# Patient Record
Sex: FEMALE | Race: ASIAN | NOT HISPANIC OR LATINO | Employment: FULL TIME
[De-identification: names, ages, dates, MRNs, and addresses within clinical notes are randomized per-mention and may not be internally consistent; named-entity substitution may affect disease eponyms.]

---

## 2017-10-29 ENCOUNTER — HEALTH MAINTENANCE LETTER (OUTPATIENT)
Age: 32
End: 2017-10-29

## 2020-03-01 ENCOUNTER — HEALTH MAINTENANCE LETTER (OUTPATIENT)
Age: 35
End: 2020-03-01

## 2020-12-14 ENCOUNTER — HEALTH MAINTENANCE LETTER (OUTPATIENT)
Age: 35
End: 2020-12-14

## 2021-04-17 ENCOUNTER — HEALTH MAINTENANCE LETTER (OUTPATIENT)
Age: 36
End: 2021-04-17

## 2021-10-02 ENCOUNTER — HEALTH MAINTENANCE LETTER (OUTPATIENT)
Age: 36
End: 2021-10-02

## 2022-05-14 ENCOUNTER — HEALTH MAINTENANCE LETTER (OUTPATIENT)
Age: 37
End: 2022-05-14

## 2022-09-03 ENCOUNTER — HEALTH MAINTENANCE LETTER (OUTPATIENT)
Age: 37
End: 2022-09-03

## 2023-06-02 ENCOUNTER — HEALTH MAINTENANCE LETTER (OUTPATIENT)
Age: 38
End: 2023-06-02

## 2024-05-15 ENCOUNTER — HOSPITAL ENCOUNTER (EMERGENCY)
Facility: HOSPITAL | Age: 39
Discharge: HOME | End: 2024-05-15
Payer: COMMERCIAL

## 2024-05-15 VITALS
OXYGEN SATURATION: 100 % | HEART RATE: 92 BPM | WEIGHT: 150 LBS | HEIGHT: 59 IN | TEMPERATURE: 98.9 F | RESPIRATION RATE: 16 BRPM | DIASTOLIC BLOOD PRESSURE: 84 MMHG | BODY MASS INDEX: 30.24 KG/M2 | SYSTOLIC BLOOD PRESSURE: 131 MMHG

## 2024-05-15 DIAGNOSIS — R30.0 DYSURIA: ICD-10-CM

## 2024-05-15 DIAGNOSIS — R39.15 URINARY URGENCY: ICD-10-CM

## 2024-05-15 DIAGNOSIS — N39.0 URINARY TRACT INFECTION IN FEMALE: Primary | ICD-10-CM

## 2024-05-15 PROBLEM — E11.9 CONTROLLED TYPE 2 DIABETES MELLITUS WITHOUT COMPLICATION, WITH LONG-TERM CURRENT USE OF INSULIN (H): Status: ACTIVE | Noted: 2024-05-15

## 2024-05-15 PROBLEM — Z79.4 CONTROLLED TYPE 2 DIABETES MELLITUS WITHOUT COMPLICATION, WITH LONG-TERM CURRENT USE OF INSULIN (H): Status: ACTIVE | Noted: 2024-05-15

## 2024-05-15 LAB
ALBUMIN SERPL BCG-MCNC: 4.5 G/DL (ref 3.5–5.2)
ALBUMIN UR-MCNC: 100 MG/DL
ALP SERPL-CCNC: 68 U/L (ref 40–150)
ALT SERPL W P-5'-P-CCNC: 12 U/L (ref 0–50)
ANION GAP SERPL CALCULATED.3IONS-SCNC: 12 MMOL/L (ref 7–15)
APPEARANCE UR: ABNORMAL
AST SERPL W P-5'-P-CCNC: 18 U/L (ref 0–45)
BASOPHILS # BLD AUTO: 0 10E3/UL (ref 0–0.2)
BASOPHILS NFR BLD AUTO: 0 %
BILIRUB DIRECT SERPL-MCNC: <0.2 MG/DL (ref 0–0.3)
BILIRUB SERPL-MCNC: 0.6 MG/DL
BILIRUB UR QL STRIP: NEGATIVE
BUN SERPL-MCNC: 10.8 MG/DL (ref 6–20)
CALCIUM SERPL-MCNC: 10.2 MG/DL (ref 8.6–10)
CHLORIDE SERPL-SCNC: 101 MMOL/L (ref 98–107)
COLOR UR AUTO: ABNORMAL
CREAT SERPL-MCNC: 0.68 MG/DL (ref 0.51–0.95)
DEPRECATED HCO3 PLAS-SCNC: 24 MMOL/L (ref 22–29)
EGFRCR SERPLBLD CKD-EPI 2021: >90 ML/MIN/1.73M2
EOSINOPHIL # BLD AUTO: 0.1 10E3/UL (ref 0–0.7)
EOSINOPHIL NFR BLD AUTO: 1 %
ERYTHROCYTE [DISTWIDTH] IN BLOOD BY AUTOMATED COUNT: 13.3 % (ref 10–15)
GLUCOSE SERPL-MCNC: 167 MG/DL (ref 70–99)
GLUCOSE UR STRIP-MCNC: 50 MG/DL
HCG UR QL: NEGATIVE
HCT VFR BLD AUTO: 37.1 % (ref 35–47)
HGB BLD-MCNC: 12.4 G/DL (ref 11.7–15.7)
HGB UR QL STRIP: ABNORMAL
HOLD SPECIMEN: NORMAL
HOLD SPECIMEN: NORMAL
IMM GRANULOCYTES # BLD: 0 10E3/UL
IMM GRANULOCYTES NFR BLD: 0 %
KETONES UR STRIP-MCNC: NEGATIVE MG/DL
LEUKOCYTE ESTERASE UR QL STRIP: ABNORMAL
LYMPHOCYTES # BLD AUTO: 1.5 10E3/UL (ref 0.8–5.3)
LYMPHOCYTES NFR BLD AUTO: 13 %
MCH RBC QN AUTO: 27.6 PG (ref 26.5–33)
MCHC RBC AUTO-ENTMCNC: 33.4 G/DL (ref 31.5–36.5)
MCV RBC AUTO: 83 FL (ref 78–100)
MONOCYTES # BLD AUTO: 0.8 10E3/UL (ref 0–1.3)
MONOCYTES NFR BLD AUTO: 6 %
NEUTROPHILS # BLD AUTO: 9.7 10E3/UL (ref 1.6–8.3)
NEUTROPHILS NFR BLD AUTO: 80 %
NITRATE UR QL: NEGATIVE
NRBC # BLD AUTO: 0 10E3/UL
NRBC BLD AUTO-RTO: 0 /100
PH UR STRIP: 6 [PH] (ref 5–7)
PLATELET # BLD AUTO: 249 10E3/UL (ref 150–450)
POTASSIUM SERPL-SCNC: 3.8 MMOL/L (ref 3.4–5.3)
PROT SERPL-MCNC: 8.2 G/DL (ref 6.4–8.3)
RBC # BLD AUTO: 4.49 10E6/UL (ref 3.8–5.2)
RBC URINE: >182 /HPF
SODIUM SERPL-SCNC: 137 MMOL/L (ref 135–145)
SP GR UR STRIP: 1.01 (ref 1–1.03)
UROBILINOGEN UR STRIP-MCNC: <2 MG/DL
WBC # BLD AUTO: 12.2 10E3/UL (ref 4–11)
WBC URINE: 107 /HPF

## 2024-05-15 PROCEDURE — 36415 COLL VENOUS BLD VENIPUNCTURE: CPT

## 2024-05-15 PROCEDURE — 82248 BILIRUBIN DIRECT: CPT

## 2024-05-15 PROCEDURE — 87086 URINE CULTURE/COLONY COUNT: CPT | Performed by: STUDENT IN AN ORGANIZED HEALTH CARE EDUCATION/TRAINING PROGRAM

## 2024-05-15 PROCEDURE — 82374 ASSAY BLOOD CARBON DIOXIDE: CPT

## 2024-05-15 PROCEDURE — 81025 URINE PREGNANCY TEST: CPT | Performed by: EMERGENCY MEDICINE

## 2024-05-15 PROCEDURE — 85025 COMPLETE CBC W/AUTO DIFF WBC: CPT

## 2024-05-15 PROCEDURE — 99284 EMERGENCY DEPT VISIT MOD MDM: CPT

## 2024-05-15 PROCEDURE — 81001 URINALYSIS AUTO W/SCOPE: CPT | Performed by: STUDENT IN AN ORGANIZED HEALTH CARE EDUCATION/TRAINING PROGRAM

## 2024-05-15 PROCEDURE — 87186 SC STD MICRODIL/AGAR DIL: CPT | Performed by: STUDENT IN AN ORGANIZED HEALTH CARE EDUCATION/TRAINING PROGRAM

## 2024-05-15 RX ORDER — PHENAZOPYRIDINE HYDROCHLORIDE 100 MG/1
200 TABLET, FILM COATED ORAL 3 TIMES DAILY PRN
Qty: 6 TABLET | Refills: 0 | Status: SHIPPED | OUTPATIENT
Start: 2024-05-15 | End: 2024-05-17

## 2024-05-15 RX ORDER — NITROFURANTOIN 25; 75 MG/1; MG/1
100 CAPSULE ORAL 2 TIMES DAILY
Qty: 10 CAPSULE | Refills: 0 | Status: SHIPPED | OUTPATIENT
Start: 2024-05-15 | End: 2024-05-20

## 2024-05-15 ASSESSMENT — ACTIVITIES OF DAILY LIVING (ADL): ADLS_ACUITY_SCORE: 35

## 2024-05-15 ASSESSMENT — COLUMBIA-SUICIDE SEVERITY RATING SCALE - C-SSRS
6. HAVE YOU EVER DONE ANYTHING, STARTED TO DO ANYTHING, OR PREPARED TO DO ANYTHING TO END YOUR LIFE?: NO
1. IN THE PAST MONTH, HAVE YOU WISHED YOU WERE DEAD OR WISHED YOU COULD GO TO SLEEP AND NOT WAKE UP?: NO
2. HAVE YOU ACTUALLY HAD ANY THOUGHTS OF KILLING YOURSELF IN THE PAST MONTH?: NO

## 2024-05-15 NOTE — DISCHARGE INSTRUCTIONS
Drink lots of fluids.  Finish the entire course of antibiotics (Macrobid), even if your symptoms improve before completion. It's essential to ensure the infection is fully treated.  Use acetaminophen, ibuprofen as needed for pain. I have prescribed you phenazopyridine (Pyridium) to relieve discomfort or burning during urination.  Continue taking blood pressure medication and follow up with primary care in 5-7 days for reevaluation.     Call your doctor now or seek immediate medical care if:    Symptoms such as fever, chills, nausea, or vomiting get worse or appear for the first time.     You have new pain in your back just below your rib cage. This is called flank pain.     There is new blood or pus in your urine.     You have any problems with your antibiotic medicine.   Watch closely for changes in your health, and be sure to contact your doctor if:    You are not getting better after taking an antibiotic for 2 days.     Your symptoms go away but then come back.

## 2024-05-15 NOTE — Clinical Note
Will Figueredo accompanied Mandy Figueredo to the emergency department on 5/15/2024. They may return to work on 05/16/2024.      If you have any questions or concerns, please don't hesitate to call.      Ingris Cruz PA-C

## 2024-05-15 NOTE — ED PROVIDER NOTES
Emergency Department Encounter  [facility] EMERGENCY DEPARTMENT  Mandy Figueredo   AGE: 39 year old SEX: female  YOB: 1985  MRN: [id number]      EVALUATION DATE & TIME: No admission date for patient encounter. ; PCP: Taco Arizmendi   ED PROVIDER: Ingris Cruz PA-C    CHIEF COMPLAINT: Urinary Frequency      MEDICAL DECISION MAKING   Mandy Figueredo is a 39 year old female with a pertinent history of type 2 diabetes and hypothyroidism who presents to the ED for evaluation of urinary frequency and dysuria that started suddenly this morning at 2 AM.  Patient does not get frequent UTIs.  Recently gave birth via  section on 2023.  No vaginal symptoms or concerns for sexually transmitted infections.  Currently menstruating.  No fevers, chills, nausea, vomiting, abdominal pain, or flank pain.    Vitals reviewed and hemodynamically stable, afebrile.  On exam, patient is nontoxic and well-appearing.  Abdomen is without tenderness, rebound, or guarding.  No CVA tenderness.      Urinalysis concerning for urinary tract infection.  There is no pain over the kidney region or systemic signs of infection to indicate ascending urinary tract infection. I considered pregnancy-related complications however urine pregnancy is negative thereby making this sufficiently unlikely.  Low suspicion for kidney stone, ovarian torsion, PID, and appendicitis.  Blood pressure was noted to be elevated in triage (187/91) and again at discharge (165/101).  I reevaluated patient in triage bay and changed blood pressure cuff to adult size which was appropriate based on measurements.  Repeat blood pressure 149/89.  Given recent pregnancy and delivery 11 weeks ago, did order basic preeclampsia labs.  While there is proteinuria in her urine, patient does have a concurrent UTI and has not had 2 consecutive blood pressures with systolic above 140 or diastolic above 90. Fortunately, there is no LFT abnormalities,  thrombocytopenia, or acute kidney dysfunction that would raise concern for HELLP or preeclampsia.      Blood pressures have improved significantly with private room and proper sizing blood pressure cuff.  Plan to discharge home with close primary care follow-up.   Prescription for Macrobid and Pyridium provided. Patient has a clear understanding of the discharge diagnosis, written discharge instructions, and planned follow-up with clear instructions to return to the emergency department if the condition worsens, specifically the development of systemic symptoms and the inability to tolerate fluids or antibiotics by mouth.    Medical Decision Making  Obtained supplemental history:Supplemental history obtained?: Documented in chart and Family Member/Significant Other  Reviewed external records: External records reviewed?: Documented in chart  Care impacted by chronic illness:Hypertension  Care significantly affected by social determinants of health:N/A  Did you consider but not order tests?: Work up considered but not performed and documented in chart, if applicable  Did you interpret images independently?: Independent interpretation of ECG and images noted in documentation, when applicable.  Consultation discussion with other provider:Did you involve another provider (consultant, , pharmacy, etc.)?: No  Discharge. I prescribed additional prescription strength medication(s) as charted. I considered admission, but ultimately discharged patient improving blood pressures, no evidence of pyelonephritis or sepsis.    FINAL IMPRESSION       ICD-10-CM    1. Urinary tract infection in female  N39.0       2. Urinary urgency  R39.15       3. Dysuria  R30.0           ED COURSE   3:13 PM I met and introduced myself to the patient. I gathered initial history and performed my physical exam.  Initial physical exam completed in upright chair in temporary exam room due to boarding crisis in ED while patient awaits for room with  assigned RN and vital monitoring. We discussed plan for initial workup and patient was directed back to waiting room.  5:13 PM Patient noted to have elevated /101. Patient is 11 weeks 4 days post partum. Will draw preeclampsia labs given proteinuria in urine.  5:15 PM Blood pressure reassessed with adult cuff, 149/89.  Patient without headache or abdominal pain.  Patient endorses a history of preeclampsia from last pregnancy and has been taking labetalol 100 mg daily.  6:33 PM I rechecked the patient and discussed results, discharge, follow up, and reasons to return to the ED.     MEDICATIONS GIVEN IN THE EMERGENCY DEPARTMENT:   Medications - No data to display   NEW PRESCRIPTIONS STARTED AT TODAY'S ED VISIT:  Discharge Medication List as of 5/15/2024  6:40 PM        START taking these medications    Details   nitroFURantoin macrocrystal-monohydrate (MACROBID) 100 MG capsule Take 1 capsule (100 mg) by mouth 2 times daily for 5 days, Disp-10 capsule, R-0, E-Prescribe      phenazopyridine (PYRIDIUM) 100 MG tablet Take 2 tablets (200 mg) by mouth 3 times daily as needed for urinary tract discomfort or pain, Disp-6 tablet, R-0, E-Prescribe                 =================================================================         HISTORY OF PRESENT ILLNESS   Patient information was obtained from: patient, spouse   Use of Intrepreter: N/A     Mandy Figueredo is a 39 year old female with a pertinent history of type 2 diabetes and hypothyroidism who presents to the ED by private vehicle for evaluation of increased urinary frequency and dysuria.  Patient reports symptoms started suddenly at 2 AM when she woke from sleep and had to use the restroom.  Since, she has been experiencing increased urinary frequency and feels the urge to urinate every few minutes.  Patient does not get frequent urinary tract infections.  Patient recently gave birth on  and is s/p  section.  No abnormal vaginal discharge or concerns  "for sexually transmitted infections.  Currently menstruating.  No fevers, chills, nausea, vomiting, abdominal pain, or flank pain. No history of renal stones.    Per chart review,  2024 - Patient seen for post partum care. /68.  2024 - Patient seen for GYN exam. /74.  2024 - Patient seen for post hospital follow up. /76.  2024 - Admitted to [facility].  Delivered at 38 weeks 1 day via  section.  Noted to have chronic hypertension with superimposed preeclampsia and pre-existing type 2 diabetes in pregnancy.    MEDICAL HISTORY     No past medical history on file.    Past Surgical History:   Procedure Laterality Date    APPENDECTOMY       SECTION         No family history on file.    Social History     Tobacco Use    Smoking status: Never   Substance Use Topics    Alcohol use: Not Currently       nitroFURantoin macrocrystal-monohydrate (MACROBID) 100 MG capsule  phenazopyridine (PYRIDIUM) 100 MG tablet        PHYSICAL EXAM   VITALS:  Patient Vitals for the past 24 hrs:   BP Temp Temp src Pulse Resp SpO2 Height Weight   05/15/24 1832 131/84 -- -- 92 -- 100 % -- --   05/15/24 1817 123/78 -- -- 89 -- 99 % -- --   05/15/24 1810 131/83 -- -- 89 -- 99 % -- --   05/15/24 1806 139/82 -- -- 92 -- 100 % -- --   05/15/24 1752 138/83 -- -- 86 -- 100 % -- --   05/15/24 1724 (!) 149/89 -- -- -- -- -- -- --   05/15/24 1708 (!) 165/101 -- -- -- -- -- -- --   05/15/24 1438 (!) 187/91 98.9  F (37.2  C) Oral 96 16 99 % 1.499 m (4' 11\") 68 kg (150 lb)     Body mass index is 30.3 kg/m .     Vitals reviewed. Nursing notes reviewed.  CONSITUTIONAL: Generally well-appearing female , in no acute distress. Well developed, nourished.  EYES: Conjunctivae clear, no scleral icterus. EOM grossly intact.  HENT: Normocephalic, atraumatic. Mucous membranes moist, nares normal.   NECK: Supple, gross ROM intact.   RESPIRATORY: Respiratory effort normal, speaks in full sentences. No tripod " position, no accessory muscle use. Lungs clear to auscultation bilaterally without rhonchi, wheezes, rales.  CARDIO: Normal heart rate, regular rhythm, no murmurs. No pedal edema.   GI: Soft, nondistended. Abdomen is non tender to palpation without rebound tenderness or guarding.  Negative Rebolledo sign.  No palpable masses/hernias. No CVA tenderness.  MSK: Moving all 4 extremities intentionally and without pain. No joint swelling, redness, or obvious deformity.  SKIN: Warm, dry. No rashes, petechiae, lesions. No cyanosis, pallor, or diaphoresis.  NEURO:  AxOx4. CN II-XII grossly intact, but not individually tested. No focal neurological deficits.   PSYCH: Thoughts linear and responses appropriate. Cooperative. Appropriate mood and affect.     LABS AND IMAGING   All pertinent labs reviewed and interpreted  Labs Ordered and Resulted from Time of ED Arrival to Time of ED Departure   ROUTINE UA WITH MICROSCOPIC REFLEX TO CULTURE - Abnormal       Result Value    Color Urine Orange (*)     Appearance Urine Cloudy (*)     Glucose Urine 50 (*)     Bilirubin Urine Negative      Ketones Urine Negative      Specific Gravity Urine 1.014      Blood Urine >1.0 mg/dL (*)     pH Urine 6.0      Protein Albumin Urine 100 (*)     Urobilinogen Urine <2.0      Nitrite Urine Negative      Leukocyte Esterase Urine 250 Hermilo/uL (*)     RBC Urine >182 (*)     WBC Urine 107 (*)    BASIC METABOLIC PANEL - Abnormal    Sodium 137      Potassium 3.8      Chloride 101      Carbon Dioxide (CO2) 24      Anion Gap 12      Urea Nitrogen 10.8      Creatinine 0.68      GFR Estimate >90      Calcium 10.2 (*)     Glucose 167 (*)    CBC WITH PLATELETS AND DIFFERENTIAL - Abnormal    WBC Count 12.2 (*)     RBC Count 4.49      Hemoglobin 12.4      Hematocrit 37.1      MCV 83      MCH 27.6      MCHC 33.4      RDW 13.3      Platelet Count 249      % Neutrophils 80      % Lymphocytes 13      % Monocytes 6      % Eosinophils 1      % Basophils 0      % Immature  Granulocytes 0      NRBCs per 100 WBC 0      Absolute Neutrophils 9.7 (*)     Absolute Lymphocytes 1.5      Absolute Monocytes 0.8      Absolute Eosinophils 0.1      Absolute Basophils 0.0      Absolute Immature Granulocytes 0.0      Absolute NRBCs 0.0     HCG QUALITATIVE URINE - Normal    hCG Urine Qualitative Negative     HEPATIC FUNCTION PANEL - Normal    Protein Total 8.2      Albumin 4.5      Bilirubin Total 0.6      Alkaline Phosphatase 68      AST 18      ALT 12      Bilirubin Direct <0.20     URINE CULTURE       No orders to display       Ingris Cruz PA-C   Emergency Medicine   [facility] EMERGENCY DEPARTMENT  [address] [place] [id number] [phone]  Dept: [phone]      Ingris Cruz PA-C  05/15/24 4609

## 2024-05-15 NOTE — ED NOTES
Provider (Ingris JIMENEZ) notified of patient elevated blood pressure. IV/labs ordered at this time.

## 2024-05-16 LAB — BACTERIA UR CULT: ABNORMAL

## 2024-05-17 ENCOUNTER — TELEPHONE (OUTPATIENT)
Dept: EMERGENCY MEDICINE | Facility: HOSPITAL | Age: 39
End: 2024-05-17
Payer: COMMERCIAL

## 2024-05-17 NOTE — TELEPHONE ENCOUNTER
Patient returning call.    Symptoms: Doing better. No burning or pain. Improving. No fever or abdominal.     Advised to continue and complete antibiotics. Push fluids. Reviewed return precautions. Patient is agreeable with plan and verbalized understanding. No additional questions.     Simon Helms RN  [facility]  Emergency Dept Lab Result RN  Ph# [phone]

## 2024-05-17 NOTE — TELEPHONE ENCOUNTER
[facility]    Reason for call: Lab Result Notification     Lab Result (including Rx patient on, if applicable).  If culture, copy of lab report at bottom.  Lab Result: See below  nitroFURantoin macrocrystal-monohydrate (MACROBID) 100 MG capsule BID x 5 days SUSCEPTIBLE  Creatinine Level (mg/dl)   Creatinine   Date Value Ref Range Status   05/15/2024 0.68 0.51 - 0.95 mg/dL Final    Creatinine clearance (ml/min), if applicable    Serum creatinine: 0.68 mg/dL 05/15/24 1720  Estimated creatinine clearance: 120.4 mL/min     Patient's current Symptoms:   Left voicemail message requesting a call back to [facility] ED Lab Result RN at [phone]. RN is available every day between 9 a.m. and 5:30 p.m. Will send MyChart letter.    RN Recommendations/Instructions per [facility] ED lab result protocol:   [facility] ED lab result protocol utilized: urine culture        Simon Helms RN

## 2024-06-08 ENCOUNTER — HOSPITAL ENCOUNTER (EMERGENCY)
Facility: HOSPITAL | Age: 39
Discharge: HOME | End: 2024-06-08
Payer: COMMERCIAL

## 2024-06-08 VITALS
RESPIRATION RATE: 16 BRPM | OXYGEN SATURATION: 98 % | HEIGHT: 59 IN | DIASTOLIC BLOOD PRESSURE: 99 MMHG | WEIGHT: 154.8 LBS | TEMPERATURE: 98.5 F | SYSTOLIC BLOOD PRESSURE: 166 MMHG | BODY MASS INDEX: 31.21 KG/M2 | HEART RATE: 79 BPM

## 2024-06-08 DIAGNOSIS — N39.0 URINARY TRACT INFECTION: ICD-10-CM

## 2024-06-08 LAB
ALBUMIN UR-MCNC: 100 MG/DL
ANION GAP SERPL CALCULATED.3IONS-SCNC: 11 MMOL/L (ref 7–15)
APPEARANCE UR: ABNORMAL
BACTERIA #/AREA URNS HPF: ABNORMAL /HPF
BASOPHILS # BLD AUTO: 0 10E3/UL (ref 0–0.2)
BASOPHILS NFR BLD AUTO: 0 %
BILIRUB UR QL STRIP: NEGATIVE
BUN SERPL-MCNC: 13.1 MG/DL (ref 6–20)
CALCIUM SERPL-MCNC: 9.4 MG/DL (ref 8.6–10)
CHLORIDE SERPL-SCNC: 103 MMOL/L (ref 98–107)
COLOR UR AUTO: ABNORMAL
CREAT SERPL-MCNC: 0.92 MG/DL (ref 0.51–0.95)
CRP SERPL-MCNC: 6.1 MG/L
DEPRECATED HCO3 PLAS-SCNC: 24 MMOL/L (ref 22–29)
EGFRCR SERPLBLD CKD-EPI 2021: 81 ML/MIN/1.73M2
EOSINOPHIL # BLD AUTO: 0.1 10E3/UL (ref 0–0.7)
EOSINOPHIL NFR BLD AUTO: 1 %
ERYTHROCYTE [DISTWIDTH] IN BLOOD BY AUTOMATED COUNT: 13.3 % (ref 10–15)
GLUCOSE SERPL-MCNC: 220 MG/DL (ref 70–99)
GLUCOSE UR STRIP-MCNC: >1000 MG/DL
HCT VFR BLD AUTO: 36.1 % (ref 35–47)
HGB BLD-MCNC: 12.2 G/DL (ref 11.7–15.7)
HGB UR QL STRIP: ABNORMAL
HOLD SPECIMEN: NORMAL
HOLD SPECIMEN: NORMAL
IMM GRANULOCYTES # BLD: 0.1 10E3/UL
IMM GRANULOCYTES NFR BLD: 1 %
KETONES UR STRIP-MCNC: NEGATIVE MG/DL
LEUKOCYTE ESTERASE UR QL STRIP: ABNORMAL
LYMPHOCYTES # BLD AUTO: 2.3 10E3/UL (ref 0.8–5.3)
LYMPHOCYTES NFR BLD AUTO: 21 %
MCH RBC QN AUTO: 27.8 PG (ref 26.5–33)
MCHC RBC AUTO-ENTMCNC: 33.8 G/DL (ref 31.5–36.5)
MCV RBC AUTO: 82 FL (ref 78–100)
MONOCYTES # BLD AUTO: 0.7 10E3/UL (ref 0–1.3)
MONOCYTES NFR BLD AUTO: 7 %
NEUTROPHILS # BLD AUTO: 7.4 10E3/UL (ref 1.6–8.3)
NEUTROPHILS NFR BLD AUTO: 70 %
NITRATE UR QL: NEGATIVE
NRBC # BLD AUTO: 0 10E3/UL
NRBC BLD AUTO-RTO: 0 /100
PH UR STRIP: 6.5 [PH] (ref 5–7)
PLATELET # BLD AUTO: 288 10E3/UL (ref 150–450)
POTASSIUM SERPL-SCNC: 3.8 MMOL/L (ref 3.4–5.3)
RBC # BLD AUTO: 4.39 10E6/UL (ref 3.8–5.2)
RBC URINE: >182 /HPF
SODIUM SERPL-SCNC: 138 MMOL/L (ref 135–145)
SP GR UR STRIP: 1.02 (ref 1–1.03)
UROBILINOGEN UR STRIP-MCNC: <2 MG/DL
WBC # BLD AUTO: 10.6 10E3/UL (ref 4–11)
WBC URINE: 8 /HPF

## 2024-06-08 PROCEDURE — 99284 EMERGENCY DEPT VISIT MOD MDM: CPT

## 2024-06-08 PROCEDURE — 86140 C-REACTIVE PROTEIN: CPT

## 2024-06-08 PROCEDURE — 36415 COLL VENOUS BLD VENIPUNCTURE: CPT

## 2024-06-08 PROCEDURE — 250N000013 HC RX MED GY IP 250 OP 250 PS 637

## 2024-06-08 PROCEDURE — 85025 COMPLETE CBC W/AUTO DIFF WBC: CPT

## 2024-06-08 PROCEDURE — 81001 URINALYSIS AUTO W/SCOPE: CPT | Performed by: STUDENT IN AN ORGANIZED HEALTH CARE EDUCATION/TRAINING PROGRAM

## 2024-06-08 PROCEDURE — 80048 BASIC METABOLIC PNL TOTAL CA: CPT

## 2024-06-08 RX ORDER — SULFAMETHOXAZOLE/TRIMETHOPRIM 800-160 MG
1 TABLET ORAL ONCE
Status: COMPLETED | OUTPATIENT
Start: 2024-06-08 | End: 2024-06-08

## 2024-06-08 RX ORDER — PHENAZOPYRIDINE HYDROCHLORIDE 200 MG/1
200 TABLET, FILM COATED ORAL 3 TIMES DAILY PRN
Qty: 15 TABLET | Refills: 0 | Status: SHIPPED | OUTPATIENT
Start: 2024-06-08

## 2024-06-08 RX ORDER — SULFAMETHOXAZOLE/TRIMETHOPRIM 800-160 MG
1 TABLET ORAL 2 TIMES DAILY
Qty: 14 TABLET | Refills: 0 | Status: SHIPPED | OUTPATIENT
Start: 2024-06-08

## 2024-06-08 RX ADMIN — SULFAMETHOXAZOLE AND TRIMETHOPRIM 1 TABLET: 800; 160 TABLET ORAL at 21:50

## 2024-06-08 ASSESSMENT — COLUMBIA-SUICIDE SEVERITY RATING SCALE - C-SSRS
6. HAVE YOU EVER DONE ANYTHING, STARTED TO DO ANYTHING, OR PREPARED TO DO ANYTHING TO END YOUR LIFE?: NO
2. HAVE YOU ACTUALLY HAD ANY THOUGHTS OF KILLING YOURSELF IN THE PAST MONTH?: NO
1. IN THE PAST MONTH, HAVE YOU WISHED YOU WERE DEAD OR WISHED YOU COULD GO TO SLEEP AND NOT WAKE UP?: NO

## 2024-06-09 NOTE — ED TRIAGE NOTES
Patient arrives from home. Reports increased frequency and burning with urination that started today. Hx of UTI 2 weeks ago.     Triage Assessment (Adult)       Row Name 06/08/24 1953          Triage Assessment    Airway WDL WDL        Respiratory WDL    Respiratory WDL WDL        Skin Circulation/Temperature WDL    Skin Circulation/Temperature WDL WDL        Cognitive/Neuro/Behavioral WDL    Cognitive/Neuro/Behavioral WDL WDL

## 2024-06-09 NOTE — ED PROVIDER NOTES
Emergency Department Encounter   NAME: Mandy Figueredo ; AGE: 39 year old female ; YOB: 1985 ; MRN: [id number] ; PCP: Taco Arizmendi   ED PROVIDER: Sandra Enciso PA-C    Evaluation Date & Time:   No admission date for patient encounter.    CHIEF COMPLAINT:  Urinary Frequency      Impression and Plan      Mandy Figueredo is a 39 year old female who presents for evaluation of dysuria, hematuria, and frequency/urgency that started today.  Was recently treated with a UTI twice over the past month and a half.  Both times was prescribed Macrobid.  With resolution of symptoms.  Denies abdominal, flank, back pain.  Denies concerns for STI.  Denies vaginal discharge, vaginal bleeding, or vaginal itching. Vitals show hypertension but afebrile and no tachycardia. On examination, well-appearing female in no acute distress. No CVA tenderness.  Abdomen non-tender.  exam deferred.    Highly suspect UTI/cystitis but given hematuria, will get CBC and BMP to better assess kidney function and for evidence of infection.  At this point I do not think we need CT imaging because she is not having any abdominal flank or back pain so ureterolithiasis is highly unlikely.  Her urine does show some LE, bacteria, WBC and no squamous cells to indicate contamination so we will treat with antibiotics for infection.  I am cautious to prescribe Macrobid again given that she has had that antibiotic twice within the past month and a half for recurrent UTIs.  Her recent culture was susceptible to Bactrim and she has no allergy to this so we will prescribe that as it will also cover for pyelo although I do not suspect that at this time given normal vitals and no CVA tenderness or systemic symptoms like nausea, vomiting, back pain. Although could be STIs or other causes like candidia, she denies concerns for this or wanting to be tested and no yeast on UA. Recommended that she follow-up with her primary care provider to discuss why she is  getting recurrent UTIs.  All questions were answered.  Discharged in stable condition..     Medical Decision Making  Obtained supplemental history:Supplemental history obtained?: No  Reviewed external records: External records reviewed?: No  Care impacted by chronic illness:Diabetes  Care significantly affected by social determinants of health:N/A  Did you consider but not order tests?: Work up considered but not performed and documented in chart, if applicable  Did you interpret images independently?: Independent interpretation of ECG and images noted in documentation, when applicable.  Consultation discussion with other provider:Did you involve another provider (consultant, , pharmacy, etc.)?: No  Discharge. I prescribed additional prescription strength medication(s) as charted. See documentation for any additional details.      ED Course:  7:49 PM I met and introduced myself to the patient. I gathered initial history and performed my physical exam. We discussed plan for initial workup.         9:24 PM We discussed the plan for discharge and the patient is agreeable. Reviewed supportive cares, symptomatic treatment, outpatient follow up, and reasons to return to the Emergency Department. Patient was discharged by ED RN in stable condition but instructed to return to the emergency department with any new or worsening of symptoms. Patient expressed understanding, feels comfortable, and is in agreement with this plan. All questions addressed prior to discharge.    FINAL IMPRESSION:    ICD-10-CM    1. Urinary tract infection  N39.0           MEDICATIONS GIVEN IN THE EMERGENCY DEPARTMENT:  Medications   sulfamethoxazole-trimethoprim (BACTRIM DS) 800-160 MG per tablet 1 tablet (1 tablet Oral $Given 6/8/24 1875)       NEW PRESCRIPTIONS STARTED AT TODAY'S ED VISIT:  Discharge Medication List as of 6/8/2024  9:51 PM        START taking these medications    Details   phenazopyridine (PYRIDIUM) 200 MG tablet Take 1 tablet  (200 mg) by mouth 3 times daily as needed for irritation, Disp-15 tablet, R-0, E-Prescribe      sulfamethoxazole-trimethoprim (BACTRIM DS) 800-160 MG tablet Take 1 tablet by mouth 2 times daily, Disp-14 tablet, R-0, E-Prescribe             HPI   Patient information was obtained from: Patient   Use of Intrepreter: N/A     Mandy Figueredo is a 39 year old female with a pertinent history of prior UTI, type II diabetes, hypertension, and hypothyroidism who presents to the ED by walk In for evaluation of urinary frequency and pain.     Patient presented with symptoms of increased frequency of urination and urinary pain. Per chart review, she is a type 2 diabetic and s/p C section in February. Recently she has been to visit a doctor twice for a UTI. The last one being 5/15/24. Both times she was treated with the same anti-biotic (macrobid). Both times after antibiotic use, the pain and increased urination would subside for a bit then comes back. The most recent recurrence happened today (24) including frequency, urgency, and dysuria. LMP 5/15/24. She stated she did not feel pain in her back, sides, or abdomen. Denies vaginal discharge or pain. Denies concerns for STI or wanting testing for that here today. Patient also mentioned she has not made any changes to soap or detergent she uses. No hx of kidney stones. No other medical problems reported.       Medical History     No past medical history on file.    Past Surgical History:   Procedure Laterality Date    APPENDECTOMY       SECTION         No family history on file.    Social History     Tobacco Use    Smoking status: Never   Substance Use Topics    Alcohol use: Not Currently       phenazopyridine (PYRIDIUM) 200 MG tablet  sulfamethoxazole-trimethoprim (BACTRIM DS) 800-160 MG tablet        Physical Exam     First Vitals:  Patient Vitals for the past 24 hrs:   BP Temp Temp src Pulse Resp SpO2 Height Weight   24 2154 (!) 166/99 -- -- 79 -- -- -- --  "  06/08/24 1952 130/85 98.5  F (36.9  C) Oral 100 16 98 % 1.499 m (4' 11\") 70.2 kg (154 lb 12.8 oz)       PHYSICAL EXAM:   Physical Exam  Constitutional:       General: She is not in acute distress.     Appearance: Normal appearance. She is not ill-appearing.   HENT:      Head: Normocephalic and atraumatic.      Nose: Nose normal.      Mouth/Throat:      Mouth: Mucous membranes are moist.   Eyes:      Extraocular Movements: Extraocular movements intact.      Conjunctiva/sclera: Conjunctivae normal.   Cardiovascular:      Rate and Rhythm: Normal rate and regular rhythm.   Pulmonary:      Effort: Pulmonary effort is normal. No respiratory distress.      Breath sounds: Normal breath sounds.   Abdominal:      General: Abdomen is flat. There is no distension.      Palpations: Abdomen is soft.      Tenderness: There is no abdominal tenderness. There is no right CVA tenderness, left CVA tenderness, guarding or rebound.   Genitourinary:     Comments: deferred  Musculoskeletal:      Cervical back: Normal range of motion and neck supple.   Neurological:      Mental Status: She is alert and oriented to person, place, and time.   Psychiatric:         Mood and Affect: Mood normal.           Results     LAB:  All pertinent labs reviewed and interpreted  Labs Ordered and Resulted from Time of ED Arrival to Time of ED Departure   ROUTINE UA WITH MICROSCOPIC REFLEX TO CULTURE - Abnormal       Result Value    Color Urine Orange (*)     Appearance Urine Turbid (*)     Glucose Urine >1000 (*)     Bilirubin Urine Negative      Ketones Urine Negative      Specific Gravity Urine 1.018      Blood Urine >1.0 mg/dL (*)     pH Urine 6.5      Protein Albumin Urine 100 (*)     Urobilinogen Urine <2.0      Nitrite Urine Negative      Leukocyte Esterase Urine 75 Hermilo/uL (*)     Bacteria Urine Few (*)     RBC Urine >182 (*)     WBC Urine 8 (*)    BASIC METABOLIC PANEL - Abnormal    Sodium 138      Potassium 3.8      Chloride 103      Carbon Dioxide " (CO2) 24      Anion Gap 11      Urea Nitrogen 13.1      Creatinine 0.92      GFR Estimate 81      Calcium 9.4      Glucose 220 (*)    CRP INFLAMMATION - Abnormal    CRP Inflammation 6.10 (*)    CBC WITH PLATELETS AND DIFFERENTIAL    WBC Count 10.6      RBC Count 4.39      Hemoglobin 12.2      Hematocrit 36.1      MCV 82      MCH 27.8      MCHC 33.8      RDW 13.3      Platelet Count 288      % Neutrophils 70      % Lymphocytes 21      % Monocytes 7      % Eosinophils 1      % Basophils 0      % Immature Granulocytes 1      NRBCs per 100 WBC 0      Absolute Neutrophils 7.4      Absolute Lymphocytes 2.3      Absolute Monocytes 0.7      Absolute Eosinophils 0.1      Absolute Basophils 0.0      Absolute Immature Granulocytes 0.1      Absolute NRBCs 0.0         RADIOLOGY:  No orders to display       I, Hakeem Herman , am serving as a scribe to document services personally performed by Sandra Enciso, based on my observations and the provider's statements to me.  I, Sandra Enciso, attest that Hakeem Herman  is acting in a scribe capacity, has observed my performance of the services and has documented them in accordance with my direction.         Sandra Enciso PA-C  Emergency Medicine   [facility] EMERGENCY DEPARTMENT       Sandra Enciso PA-C  06/09/24 4041

## 2024-06-09 NOTE — DISCHARGE INSTRUCTIONS
You are seen in the emergency department for concerns with a urinary tract infection.  Your urine does show signs of infection here today but the rest of your blood work is reassuring.  We will give you your first dose of antibiotic here and I will send the rest to the pharmacy.  I will also send a medication that can help with the burning feeling that you are having.  If you develop back pain, nausea, vomiting, high fevers I would want you to be seen again as sometimes a urinary tract infection can develop into a kidney infection.  I also recommend that you follow-up with your primary doctor as to get to the bottom of why you are having recurrent urinary tract infections.

## 2024-07-06 ENCOUNTER — HEALTH MAINTENANCE LETTER (OUTPATIENT)
Age: 39
End: 2024-07-06

## 2024-11-23 ENCOUNTER — HEALTH MAINTENANCE LETTER (OUTPATIENT)
Age: 39
End: 2024-11-23

## 2024-12-04 ENCOUNTER — HOSPITAL ENCOUNTER (EMERGENCY)
Facility: HOSPITAL | Age: 39
Discharge: HOME | End: 2024-12-05
Attending: EMERGENCY MEDICINE
Payer: MEDICAID

## 2024-12-04 VITALS
DIASTOLIC BLOOD PRESSURE: 93 MMHG | BODY MASS INDEX: 29.23 KG/M2 | WEIGHT: 145 LBS | HEART RATE: 101 BPM | OXYGEN SATURATION: 96 % | TEMPERATURE: 98.6 F | SYSTOLIC BLOOD PRESSURE: 159 MMHG | HEIGHT: 59 IN | RESPIRATION RATE: 26 BRPM

## 2024-12-04 DIAGNOSIS — N39.0 URINARY TRACT INFECTION WITH HEMATURIA, SITE UNSPECIFIED: Primary | ICD-10-CM

## 2024-12-04 DIAGNOSIS — R31.9 URINARY TRACT INFECTION WITH HEMATURIA, SITE UNSPECIFIED: Primary | ICD-10-CM

## 2024-12-04 DIAGNOSIS — R10.9 FLANK PAIN: ICD-10-CM

## 2024-12-04 LAB
APPEARANCE UR: ABNORMAL
BACTERIA #/AREA URNS AUTO: ABNORMAL /HPF
BASOPHILS # BLD AUTO: 0.03 X10*3/UL (ref 0–0.1)
BASOPHILS NFR BLD AUTO: 0.3 %
BILIRUB UR STRIP.AUTO-MCNC: NEGATIVE MG/DL
COLOR UR: ABNORMAL
EOSINOPHIL # BLD AUTO: 0.07 X10*3/UL (ref 0–0.7)
EOSINOPHIL NFR BLD AUTO: 0.6 %
ERYTHROCYTE [DISTWIDTH] IN BLOOD BY AUTOMATED COUNT: 13 % (ref 11.5–14.5)
GLUCOSE UR STRIP.AUTO-MCNC: ABNORMAL MG/DL
HCG UR QL IA.RAPID: NEGATIVE
HCT VFR BLD AUTO: 39.2 % (ref 36–46)
HGB BLD-MCNC: 13.6 G/DL (ref 12–16)
IMM GRANULOCYTES # BLD AUTO: 0.08 X10*3/UL (ref 0–0.7)
IMM GRANULOCYTES NFR BLD AUTO: 0.7 % (ref 0–0.9)
KETONES UR STRIP.AUTO-MCNC: NEGATIVE MG/DL
LEUKOCYTE ESTERASE UR QL STRIP.AUTO: ABNORMAL
LYMPHOCYTES # BLD AUTO: 1.83 X10*3/UL (ref 1.2–4.8)
LYMPHOCYTES NFR BLD AUTO: 16.6 %
MCH RBC QN AUTO: 28 PG (ref 26–34)
MCHC RBC AUTO-ENTMCNC: 34.7 G/DL (ref 32–36)
MCV RBC AUTO: 81 FL (ref 80–100)
MONOCYTES # BLD AUTO: 0.96 X10*3/UL (ref 0.1–1)
MONOCYTES NFR BLD AUTO: 8.7 %
NEUTROPHILS # BLD AUTO: 8.03 X10*3/UL (ref 1.2–7.7)
NEUTROPHILS NFR BLD AUTO: 73.1 %
NITRITE UR QL STRIP.AUTO: NEGATIVE
NRBC BLD-RTO: 0 /100 WBCS (ref 0–0)
PH UR STRIP.AUTO: 6 [PH]
PLATELET # BLD AUTO: 244 X10*3/UL (ref 150–450)
PROT UR STRIP.AUTO-MCNC: ABNORMAL MG/DL
RBC # BLD AUTO: 4.85 X10*6/UL (ref 4–5.2)
RBC # UR STRIP.AUTO: ABNORMAL /UL
RBC #/AREA URNS AUTO: >20 /HPF
SP GR UR STRIP.AUTO: 1.01
SQUAMOUS #/AREA URNS AUTO: ABNORMAL /HPF
UROBILINOGEN UR STRIP.AUTO-MCNC: NORMAL MG/DL
WBC # BLD AUTO: 11 X10*3/UL (ref 4.4–11.3)
WBC #/AREA URNS AUTO: >50 /HPF

## 2024-12-04 PROCEDURE — 85025 COMPLETE CBC W/AUTO DIFF WBC: CPT | Performed by: EMERGENCY MEDICINE

## 2024-12-04 PROCEDURE — 99284 EMERGENCY DEPT VISIT MOD MDM: CPT | Mod: 25 | Performed by: EMERGENCY MEDICINE

## 2024-12-04 PROCEDURE — 2500000004 HC RX 250 GENERAL PHARMACY W/ HCPCS (ALT 636 FOR OP/ED): Performed by: EMERGENCY MEDICINE

## 2024-12-04 PROCEDURE — 80053 COMPREHEN METABOLIC PANEL: CPT | Performed by: EMERGENCY MEDICINE

## 2024-12-04 PROCEDURE — 81025 URINE PREGNANCY TEST: CPT | Performed by: EMERGENCY MEDICINE

## 2024-12-04 PROCEDURE — 87086 URINE CULTURE/COLONY COUNT: CPT | Mod: PORLAB | Performed by: EMERGENCY MEDICINE

## 2024-12-04 PROCEDURE — 83605 ASSAY OF LACTIC ACID: CPT | Performed by: EMERGENCY MEDICINE

## 2024-12-04 PROCEDURE — 36415 COLL VENOUS BLD VENIPUNCTURE: CPT | Performed by: EMERGENCY MEDICINE

## 2024-12-04 PROCEDURE — 81001 URINALYSIS AUTO W/SCOPE: CPT | Performed by: EMERGENCY MEDICINE

## 2024-12-04 PROCEDURE — 96375 TX/PRO/DX INJ NEW DRUG ADDON: CPT

## 2024-12-04 RX ORDER — KETOROLAC TROMETHAMINE 30 MG/ML
15 INJECTION, SOLUTION INTRAMUSCULAR; INTRAVENOUS ONCE
Status: COMPLETED | OUTPATIENT
Start: 2024-12-04 | End: 2024-12-04

## 2024-12-04 RX ORDER — ONDANSETRON HYDROCHLORIDE 2 MG/ML
4 INJECTION, SOLUTION INTRAVENOUS ONCE
Status: COMPLETED | OUTPATIENT
Start: 2024-12-04 | End: 2024-12-04

## 2024-12-04 RX ADMIN — ONDANSETRON 4 MG: 2 INJECTION INTRAMUSCULAR; INTRAVENOUS at 23:39

## 2024-12-04 RX ADMIN — KETOROLAC TROMETHAMINE 15 MG: 30 INJECTION, SOLUTION INTRAMUSCULAR at 23:39

## 2024-12-04 ASSESSMENT — PAIN DESCRIPTION - DESCRIPTORS
DESCRIPTORS_2: CRAMPING
DESCRIPTORS: SHARP

## 2024-12-04 ASSESSMENT — PAIN DESCRIPTION - LOCATION
LOCATION: BACK
LOCATION_2: ABDOMEN

## 2024-12-04 ASSESSMENT — COLUMBIA-SUICIDE SEVERITY RATING SCALE - C-SSRS
1. IN THE PAST MONTH, HAVE YOU WISHED YOU WERE DEAD OR WISHED YOU COULD GO TO SLEEP AND NOT WAKE UP?: NO
6. HAVE YOU EVER DONE ANYTHING, STARTED TO DO ANYTHING, OR PREPARED TO DO ANYTHING TO END YOUR LIFE?: NO
2. HAVE YOU ACTUALLY HAD ANY THOUGHTS OF KILLING YOURSELF?: NO

## 2024-12-04 ASSESSMENT — LIFESTYLE VARIABLES
EVER FELT BAD OR GUILTY ABOUT YOUR DRINKING: NO
EVER HAD A DRINK FIRST THING IN THE MORNING TO STEADY YOUR NERVES TO GET RID OF A HANGOVER: NO
HAVE YOU EVER FELT YOU SHOULD CUT DOWN ON YOUR DRINKING: NO
HAVE PEOPLE ANNOYED YOU BY CRITICIZING YOUR DRINKING: NO
TOTAL SCORE: 0

## 2024-12-04 ASSESSMENT — PAIN DESCRIPTION - ORIENTATION
ORIENTATION: LEFT;LOWER
ORIENTATION_2: LEFT;LOWER

## 2024-12-04 ASSESSMENT — PAIN DESCRIPTION - FREQUENCY: FREQUENCY: CONSTANT/CONTINUOUS

## 2024-12-04 ASSESSMENT — PAIN DESCRIPTION - PAIN TYPE: TYPE: ACUTE PAIN

## 2024-12-04 ASSESSMENT — PAIN SCALES - GENERAL
PAINLEVEL_OUTOF10: 9
PAINLEVEL_OUTOF10: 8
PAINLEVEL_OUTOF10: 6

## 2024-12-04 ASSESSMENT — PAIN - FUNCTIONAL ASSESSMENT: PAIN_FUNCTIONAL_ASSESSMENT: 0-10

## 2024-12-05 ENCOUNTER — APPOINTMENT (OUTPATIENT)
Dept: RADIOLOGY | Facility: HOSPITAL | Age: 39
End: 2024-12-05
Payer: MEDICAID

## 2024-12-05 LAB
ALBUMIN SERPL BCP-MCNC: 4.3 G/DL (ref 3.4–5)
ALP SERPL-CCNC: 66 U/L (ref 33–110)
ALT SERPL W P-5'-P-CCNC: 11 U/L (ref 7–45)
ANION GAP SERPL CALC-SCNC: 12 MMOL/L (ref 10–20)
AST SERPL W P-5'-P-CCNC: 11 U/L (ref 9–39)
BILIRUB SERPL-MCNC: 1.3 MG/DL (ref 0–1.2)
BUN SERPL-MCNC: 16 MG/DL (ref 6–23)
CALCIUM SERPL-MCNC: 9.2 MG/DL (ref 8.6–10.3)
CHLORIDE SERPL-SCNC: 98 MMOL/L (ref 98–107)
CO2 SERPL-SCNC: 24 MMOL/L (ref 21–32)
CREAT SERPL-MCNC: 0.69 MG/DL (ref 0.5–1.05)
EGFRCR SERPLBLD CKD-EPI 2021: >90 ML/MIN/1.73M*2
GLUCOSE SERPL-MCNC: 195 MG/DL (ref 74–99)
HOLD SPECIMEN: NORMAL
LACTATE SERPL-SCNC: 1.1 MMOL/L (ref 0.4–2)
POTASSIUM SERPL-SCNC: 3.4 MMOL/L (ref 3.5–5.3)
PROT SERPL-MCNC: 7.7 G/DL (ref 6.4–8.2)
SODIUM SERPL-SCNC: 131 MMOL/L (ref 136–145)

## 2024-12-05 PROCEDURE — 2550000001 HC RX 255 CONTRASTS: Performed by: EMERGENCY MEDICINE

## 2024-12-05 PROCEDURE — 74177 CT ABD & PELVIS W/CONTRAST: CPT | Performed by: RADIOLOGY

## 2024-12-05 PROCEDURE — 96365 THER/PROPH/DIAG IV INF INIT: CPT | Mod: 59

## 2024-12-05 PROCEDURE — 74177 CT ABD & PELVIS W/CONTRAST: CPT

## 2024-12-05 PROCEDURE — 2500000004 HC RX 250 GENERAL PHARMACY W/ HCPCS (ALT 636 FOR OP/ED): Performed by: EMERGENCY MEDICINE

## 2024-12-05 RX ORDER — HYDROCODONE BITARTRATE AND ACETAMINOPHEN 5; 325 MG/1; MG/1
1 TABLET ORAL EVERY 6 HOURS PRN
Qty: 12 TABLET | Refills: 0 | Status: SHIPPED | OUTPATIENT
Start: 2024-12-05 | End: 2024-12-08

## 2024-12-05 RX ORDER — CEFTRIAXONE 1 G/50ML
1 INJECTION, SOLUTION INTRAVENOUS ONCE
Status: COMPLETED | OUTPATIENT
Start: 2024-12-05 | End: 2024-12-05

## 2024-12-05 RX ADMIN — CEFTRIAXONE SODIUM 1 G: 1 INJECTION, SOLUTION INTRAVENOUS at 01:38

## 2024-12-05 RX ADMIN — IOHEXOL 75 ML: 350 INJECTION, SOLUTION INTRAVENOUS at 00:31

## 2024-12-05 ASSESSMENT — PAIN SCALES - GENERAL: PAINLEVEL_OUTOF10: 3

## 2024-12-05 ASSESSMENT — PAIN - FUNCTIONAL ASSESSMENT: PAIN_FUNCTIONAL_ASSESSMENT: 0-10

## 2024-12-05 NOTE — ED PROVIDER NOTES
HPI   Chief Complaint   Patient presents with    Flank Pain    Abdominal Pain     Pt woke 0200 with urinary burning and pain seen at  and given antibiotic  pt also c/o lt flank and LLQ pain that is getting worse   Ibuprofen 1730        Patient presents emergency department secondary to left flank pain.  Patient woke up with urinary frequency and burning with urination overnight last night.  She went to urgent care and was diagnosed with urinary tract infection and placed on antibiotics.  She was told to come to the emergency department if she has worsening flank pain.  Patient states that the flank pain has worsened since her urgent care visit so she presents here.  She has had 2 doses of Keflex today.  She has had relatively frequent urinary tract infections.  Last infection was in May of this year.  She states that she is never been called to change antibiotics based on culture results.  She is never had to be hospitalized for urinary infections in the past.  She has nausea but no vomiting.  No change in bowel movements.  She is starting to feel like she is developing fever.  No documented fevers.  She has had  but no other abdominal surgeries.                          [place] Coma Scale Score: 15                  Patient History   History reviewed. No pertinent past medical history.  History reviewed. No pertinent surgical history.  No family history on file.  Social History     Tobacco Use    Smoking status: Never    Smokeless tobacco: Never   Vaping Use    Vaping status: Never Used   Substance Use Topics    Alcohol use: Yes     Comment: rare    Drug use: Never       Physical Exam   ED Triage Vitals [24 2235]   Temperature Heart Rate Respirations BP   37 °C (98.6 °F) 98 20 (!) 152/91      Pulse Ox Temp Source Heart Rate Source Patient Position   98 % Oral Monitor Sitting      BP Location FiO2 (%)     Left arm --       Physical Exam  Vitals and nursing note reviewed.   Constitutional:        Appearance: Normal appearance. She is well-developed.   HENT:      Head: Normocephalic.      Right Ear: Tympanic membrane normal.      Left Ear: Tympanic membrane normal.      Nose: Nose normal.      Mouth/Throat:      Mouth: Mucous membranes are moist.   Eyes:      Extraocular Movements: Extraocular movements intact.      Pupils: Pupils are equal, round, and reactive to light.   Cardiovascular:      Rate and Rhythm: Normal rate and regular rhythm.      Pulses: Normal pulses.      Heart sounds: Normal heart sounds.   Pulmonary:      Effort: Pulmonary effort is normal.      Breath sounds: Normal breath sounds. No wheezing, rhonchi or rales.   Abdominal:      General: Abdomen is flat. Bowel sounds are normal.      Palpations: Abdomen is soft.      Tenderness: There is left CVA tenderness.   Musculoskeletal:         General: Normal range of motion.   Skin:     General: Skin is warm and dry.      Capillary Refill: Capillary refill takes less than 2 seconds.   Neurological:      General: No focal deficit present.      Mental Status: She is alert and oriented to person, place, and time.   Psychiatric:         Mood and Affect: Mood normal.         Behavior: Behavior normal.       Labs Reviewed   URINALYSIS WITH REFLEX CULTURE AND MICROSCOPIC    Narrative:     The following orders were created for panel order Urinalysis with Reflex Culture and Microscopic.  Procedure                               Abnormality         Status                     ---------                               -----------         ------                     Urinalysis with Reflex C...[[id number]]                                                 Extra Urine Gray Tube[[id number]]                                                         Please view results for these tests on the individual orders.   CBC WITH AUTO DIFFERENTIAL   COMPREHENSIVE METABOLIC PANEL   LACTATE   HCG, URINE, QUALITATIVE   URINALYSIS WITH REFLEX CULTURE AND MICROSCOPIC   EXTRA URINE GRAY  TUBE     Pain Management Panel           No data to display              CT abdomen pelvis w IV contrast    (Results Pending)     ED Course & MDM   Diagnoses as of 12/05/24 0125   Urinary tract infection with hematuria, site unspecified   Flank pain       Medical Decision Making  Patient presents to the emergency department secondary to left flank pain and dysuria.  Patient was evaluated in the emergency department for pyelonephritis, sepsis, ureteral stones or other acute cause.  Laboratory workup including CBC and CMP are obtained.  Urinalysis and urine hCG are ordered.  CT abdomen pelvis with IV contrast is obtained to evaluate for intra-abdominal process.  Patient is ordered 15 mg of IV Toradol and 4 mg of IV Zofran for pain and nausea.  Patient CT scan shows no evidence of acute intra-abdominal process.  Laboratory workup does show evidence of acute urinary tract infection.  No leukocytosis.  Patient is currently hemodynamically stable.  Pain is improved.  At this time patient does not warrant admission to the hospital.  She is given a dose of ceftriaxone IV and is to continue with the Keflex at home.  She should return if no improvement in symptoms after 48 hours.  Patient is stable for discharge at this time.        Procedure  Procedures     Lashell Wheeler MD  12/05/24 0125

## 2024-12-07 LAB — BACTERIA UR CULT: ABNORMAL

## 2024-12-09 ENCOUNTER — TELEPHONE (OUTPATIENT)
Dept: PHARMACY | Facility: HOSPITAL | Age: 39
End: 2024-12-09
Payer: MEDICAID

## 2024-12-09 DIAGNOSIS — N10 ACUTE PYELONEPHRITIS: Primary | ICD-10-CM

## 2024-12-09 RX ORDER — SULFAMETHOXAZOLE AND TRIMETHOPRIM 800; 160 MG/1; MG/1
1 TABLET ORAL 2 TIMES DAILY
Qty: 14 TABLET | Refills: 0 | Status: SHIPPED | OUTPATIENT
Start: 2024-12-09 | End: 2024-12-16

## 2024-12-09 NOTE — PROGRESS NOTES
EDPD Note: Bug-Drug Mismatch    Contacted Mr./Mrs./Ms. Elaina Flores regarding a positive urine culture/result that was taken during their recent emergency room visit. I completed education with patient. The patient is not being treated appropriately with Keflex 500mg TID x10.     Patient presented to the ED for flank pain, burning and pain with urination. Prescribed Keflex at urgent care, then presented to the ED after worsening flank pain. At time of call, patient reports continued flank pain, as well as pain and burning with urination, for which she has been taking Norco prescribed in the ED. Advised patient to finish full course of new antibiotic and follow up with PCP or urgent care if symptoms do not completely resolve.      The following prescription was sent to the patient's preferred pharmacy. No further follow up needed from EDPD Team.   Drug: Bactrim DS   Sig: BID x7  Days Supply: 7    Susceptibility data from last 90 days.  Collected Specimen Info Organism Amoxicillin/Clavulanate Ampicillin Ampicillin/Sulbactam Aztreonam Cefazolin Cefazolin (uncomplicated UTIs only) Cefepime Cefotaxime Ceftazidime Ceftriaxone Ciprofloxacin Ertapenem   12/04/24 Urine from Clean Catch/Voided Escherichia coli  S  R  S  R  R  R  R  R  R  R  S  S     Collected Specimen Info Organism Gentamicin Meropenem Nitrofurantoin Piperacillin/Tazobactam Trimethoprim/Sulfamethoxazole   12/04/24 Urine from Clean Catch/Voided Escherichia coli  S  S  S  S  S     If there are any other questions for the ED Post-Discharge Follow Up Team, please contact [phone]. Fax: [phone].     [address], [place]

## 2025-03-08 ENCOUNTER — HEALTH MAINTENANCE LETTER (OUTPATIENT)
Age: 40
End: 2025-03-08

## 2025-05-29 ENCOUNTER — APPOINTMENT (OUTPATIENT)
Dept: PRIMARY CARE | Facility: CLINIC | Age: 40
End: 2025-05-29
Payer: MEDICAID

## 2025-06-22 ENCOUNTER — HEALTH MAINTENANCE LETTER (OUTPATIENT)
Age: 40
End: 2025-06-22

## 2025-07-13 ENCOUNTER — HEALTH MAINTENANCE LETTER (OUTPATIENT)
Age: 40
End: 2025-07-13

## 2025-08-14 ENCOUNTER — APPOINTMENT (OUTPATIENT)
Dept: PRIMARY CARE | Facility: CLINIC | Age: 40
End: 2025-08-14
Payer: MEDICAID

## 2025-08-14 VITALS
HEIGHT: 59 IN | HEART RATE: 91 BPM | DIASTOLIC BLOOD PRESSURE: 96 MMHG | RESPIRATION RATE: 16 BRPM | SYSTOLIC BLOOD PRESSURE: 159 MMHG | TEMPERATURE: 97 F | BODY MASS INDEX: 29.64 KG/M2 | WEIGHT: 147 LBS | OXYGEN SATURATION: 99 %

## 2025-08-14 DIAGNOSIS — E11.9 TYPE 2 DIABETES MELLITUS WITHOUT COMPLICATION, WITHOUT LONG-TERM CURRENT USE OF INSULIN: ICD-10-CM

## 2025-08-14 DIAGNOSIS — Z76.89 ENCOUNTER TO ESTABLISH CARE WITH NEW DOCTOR: Primary | ICD-10-CM

## 2025-08-14 DIAGNOSIS — I10 PRIMARY HYPERTENSION: ICD-10-CM

## 2025-08-14 DIAGNOSIS — E03.9 ACQUIRED HYPOTHYROIDISM: ICD-10-CM

## 2025-08-14 PROCEDURE — 3008F BODY MASS INDEX DOCD: CPT | Performed by: STUDENT IN AN ORGANIZED HEALTH CARE EDUCATION/TRAINING PROGRAM

## 2025-08-14 PROCEDURE — 1036F TOBACCO NON-USER: CPT | Performed by: STUDENT IN AN ORGANIZED HEALTH CARE EDUCATION/TRAINING PROGRAM

## 2025-08-14 PROCEDURE — 3080F DIAST BP >= 90 MM HG: CPT | Performed by: STUDENT IN AN ORGANIZED HEALTH CARE EDUCATION/TRAINING PROGRAM

## 2025-08-14 PROCEDURE — 99204 OFFICE O/P NEW MOD 45 MIN: CPT | Performed by: STUDENT IN AN ORGANIZED HEALTH CARE EDUCATION/TRAINING PROGRAM

## 2025-08-14 PROCEDURE — 3077F SYST BP >= 140 MM HG: CPT | Performed by: STUDENT IN AN ORGANIZED HEALTH CARE EDUCATION/TRAINING PROGRAM

## 2025-08-14 RX ORDER — AMLODIPINE BESYLATE 5 MG/1
5 TABLET ORAL DAILY
COMMUNITY
End: 2025-08-14 | Stop reason: SDUPTHER

## 2025-08-14 RX ORDER — AMLODIPINE BESYLATE 5 MG/1
5 TABLET ORAL DAILY
Qty: 30 TABLET | Refills: 3 | Status: SHIPPED | OUTPATIENT
Start: 2025-08-14

## 2025-08-14 RX ORDER — LEVOTHYROXINE SODIUM 125 UG/1
125 TABLET ORAL DAILY
Qty: 30 TABLET | Refills: 3 | Status: SHIPPED | OUTPATIENT
Start: 2025-08-14

## 2025-08-14 RX ORDER — METFORMIN HYDROCHLORIDE 500 MG/1
1000 TABLET, EXTENDED RELEASE ORAL
Qty: 120 TABLET | Refills: 3 | Status: SHIPPED | OUTPATIENT
Start: 2025-08-14

## 2025-08-14 RX ORDER — METFORMIN HYDROCHLORIDE 500 MG/1
500 TABLET, EXTENDED RELEASE ORAL
COMMUNITY
Start: 2025-06-16 | End: 2025-08-14 | Stop reason: SDUPTHER

## 2025-08-14 RX ORDER — EMPAGLIFLOZIN 10 MG/1
10 TABLET, FILM COATED ORAL
COMMUNITY
Start: 2024-10-31 | End: 2025-08-14 | Stop reason: SDUPTHER

## 2025-08-14 RX ORDER — LEVOTHYROXINE SODIUM 125 UG/1
125 TABLET ORAL DAILY
COMMUNITY
Start: 2025-04-11 | End: 2025-08-14 | Stop reason: SDUPTHER

## 2025-08-14 SDOH — ECONOMIC STABILITY: FOOD INSECURITY: WITHIN THE PAST 12 MONTHS, THE FOOD YOU BOUGHT JUST DIDN'T LAST AND YOU DIDN'T HAVE MONEY TO GET MORE.: NEVER TRUE

## 2025-08-14 SDOH — ECONOMIC STABILITY: FOOD INSECURITY: WITHIN THE PAST 12 MONTHS, YOU WORRIED THAT YOUR FOOD WOULD RUN OUT BEFORE YOU GOT MONEY TO BUY MORE.: NEVER TRUE

## 2025-08-14 ASSESSMENT — ENCOUNTER SYMPTOMS
DIARRHEA: 0
HEADACHES: 0
COUGH: 0
FATIGUE: 0
CONSTIPATION: 0
CONFUSION: 0
PALPITATIONS: 0
MUSCULOSKELETAL NEGATIVE: 1
UNEXPECTED WEIGHT CHANGE: 0
COLOR CHANGE: 0
ABDOMINAL PAIN: 0
SHORTNESS OF BREATH: 0
HYPERTENSION: 1
WHEEZING: 0
FEVER: 0
CHILLS: 0
NAUSEA: 0
VOMITING: 0
DIZZINESS: 0

## 2025-08-14 ASSESSMENT — LIFESTYLE VARIABLES
AUDIT-C TOTAL SCORE: 1
SKIP TO QUESTIONS 9-10: 1
HOW OFTEN DO YOU HAVE SIX OR MORE DRINKS ON ONE OCCASION: NEVER
HOW MANY STANDARD DRINKS CONTAINING ALCOHOL DO YOU HAVE ON A TYPICAL DAY: 1 OR 2
HOW OFTEN DO YOU HAVE A DRINK CONTAINING ALCOHOL: MONTHLY OR LESS

## 2025-08-14 ASSESSMENT — PATIENT HEALTH QUESTIONNAIRE - PHQ9
SUM OF ALL RESPONSES TO PHQ9 QUESTIONS 1 & 2: 0
1. LITTLE INTEREST OR PLEASURE IN DOING THINGS: NOT AT ALL
2. FEELING DOWN, DEPRESSED OR HOPELESS: NOT AT ALL

## 2025-08-20 LAB
ALBUMIN SERPL-MCNC: 4.4 G/DL (ref 3.6–5.1)
ALP SERPL-CCNC: 52 U/L (ref 31–125)
ALT SERPL-CCNC: 10 U/L (ref 6–29)
ANION GAP SERPL CALCULATED.4IONS-SCNC: 10 MMOL/L (CALC) (ref 7–17)
AST SERPL-CCNC: 14 U/L (ref 10–30)
BILIRUB SERPL-MCNC: 0.7 MG/DL (ref 0.2–1.2)
BUN SERPL-MCNC: 14 MG/DL (ref 7–25)
CALCIUM SERPL-MCNC: 9 MG/DL (ref 8.6–10.2)
CHLORIDE SERPL-SCNC: 100 MMOL/L (ref 98–110)
CHOLEST SERPL-MCNC: 170 MG/DL
CHOLEST/HDLC SERPL: 4.3 (CALC)
CO2 SERPL-SCNC: 25 MMOL/L (ref 20–32)
CREAT SERPL-MCNC: 0.75 MG/DL (ref 0.5–0.97)
EGFRCR SERPLBLD CKD-EPI 2021: 104 ML/MIN/1.73M2
ERYTHROCYTE [DISTWIDTH] IN BLOOD BY AUTOMATED COUNT: 14 % (ref 11–15)
EST. AVERAGE GLUCOSE BLD GHB EST-MCNC: 174 MG/DL
EST. AVERAGE GLUCOSE BLD GHB EST-SCNC: 9.7 MMOL/L
GLUCOSE SERPL-MCNC: 176 MG/DL (ref 65–99)
HBA1C MFR BLD: 7.7 %
HCT VFR BLD AUTO: 43.5 % (ref 35–45)
HDLC SERPL-MCNC: 40 MG/DL
HGB BLD-MCNC: 13.6 G/DL (ref 11.7–15.5)
LDLC SERPL CALC-MCNC: ABNORMAL MG/DL
MCH RBC QN AUTO: 28.9 PG (ref 27–33)
MCHC RBC AUTO-ENTMCNC: 31.3 G/DL (ref 32–36)
MCV RBC AUTO: 92.4 FL (ref 80–100)
NONHDLC SERPL-MCNC: 130 MG/DL (CALC)
PLATELET # BLD AUTO: 303 THOUSAND/UL (ref 140–400)
PMV BLD REES-ECKER: 8.9 FL (ref 7.5–12.5)
POTASSIUM SERPL-SCNC: 4 MMOL/L (ref 3.5–5.3)
PROT SERPL-MCNC: 7.4 G/DL (ref 6.1–8.1)
RBC # BLD AUTO: 4.71 MILLION/UL (ref 3.8–5.1)
SODIUM SERPL-SCNC: 135 MMOL/L (ref 135–146)
T4 FREE SERPL-MCNC: 1.3 NG/DL (ref 0.8–1.8)
TRIGL SERPL-MCNC: 442 MG/DL
TSH SERPL-ACNC: 13.04 MIU/L
WBC # BLD AUTO: 8.2 THOUSAND/UL (ref 3.8–10.8)

## 2025-08-28 ENCOUNTER — CLINICAL SUPPORT (OUTPATIENT)
Dept: PRIMARY CARE | Facility: CLINIC | Age: 40
End: 2025-08-28
Payer: MEDICAID

## 2025-08-28 ENCOUNTER — APPOINTMENT (OUTPATIENT)
Dept: PRIMARY CARE | Facility: CLINIC | Age: 40
End: 2025-08-28
Payer: MEDICAID

## 2025-08-28 VITALS
RESPIRATION RATE: 14 BRPM | OXYGEN SATURATION: 99 % | HEART RATE: 98 BPM | DIASTOLIC BLOOD PRESSURE: 80 MMHG | TEMPERATURE: 97 F | SYSTOLIC BLOOD PRESSURE: 124 MMHG

## 2025-08-28 DIAGNOSIS — I10 PRIMARY HYPERTENSION: ICD-10-CM

## 2025-08-28 PROCEDURE — 99211 OFF/OP EST MAY X REQ PHY/QHP: CPT | Performed by: STUDENT IN AN ORGANIZED HEALTH CARE EDUCATION/TRAINING PROGRAM

## 2026-01-13 ENCOUNTER — APPOINTMENT (OUTPATIENT)
Dept: PRIMARY CARE | Facility: CLINIC | Age: 41
End: 2026-01-13
Payer: MEDICAID

## 2026-02-04 ENCOUNTER — APPOINTMENT (OUTPATIENT)
Dept: PRIMARY CARE | Facility: CLINIC | Age: 41
End: 2026-02-04
Payer: MEDICAID